# Patient Record
Sex: FEMALE | Race: WHITE | NOT HISPANIC OR LATINO | Employment: PART TIME | URBAN - METROPOLITAN AREA
[De-identification: names, ages, dates, MRNs, and addresses within clinical notes are randomized per-mention and may not be internally consistent; named-entity substitution may affect disease eponyms.]

---

## 2022-10-04 NOTE — TELEPHONE ENCOUNTER
RECORDS RECEIVED FROM: Srinivas   REASON FOR VISIT: headaches, dizziness, memory issues   Date of Appt: 10/14/2022   NOTES (FOR ALL VISITS) STATUS DETAILS   OFFICE NOTE from referring provider Care Everywhere 09/16/2022 [facility]    OFFICE NOTE from other specialist Care Everywhere 04/27/2022 [facility]    DISCHARGE SUMMARY from hospital N/A    DISCHARGE REPORT from the ER N/A    OPERATIVE REPORT N/A    MEDICATION LIST N/A    IMAGING  (FOR ALL VISITS)     EMG N/A    EEG N/A    LUMBAR PUNCTURE N/A    SHARYN SCAN N/A    ULTRASOUND (CAROTID BILAT) *VASCULAR* N/A    MRI (HEAD, NECK, SPINE) N/A    CT (HEAD, NECK, SPINE) N/A

## 2022-10-14 ENCOUNTER — VIRTUAL VISIT (OUTPATIENT)
Dept: NEUROLOGY | Facility: CLINIC | Age: 42
End: 2022-10-14
Payer: COMMERCIAL

## 2022-10-14 ENCOUNTER — PRE VISIT (OUTPATIENT)
Dept: NEUROLOGY | Facility: CLINIC | Age: 42
End: 2022-10-14

## 2022-10-14 DIAGNOSIS — G44.209 TENSION HEADACHE: Primary | ICD-10-CM

## 2022-10-14 DIAGNOSIS — R41.3 MEMORY LOSS: ICD-10-CM

## 2022-10-14 DIAGNOSIS — R42 DIZZINESS: ICD-10-CM

## 2022-10-14 PROCEDURE — 99204 OFFICE O/P NEW MOD 45 MIN: CPT | Mod: 95 | Performed by: PSYCHIATRY & NEUROLOGY

## 2022-10-14 RX ORDER — METHYLPREDNISOLONE 4 MG
TABLET, DOSE PACK ORAL
Qty: 21 TABLET | Refills: 0 | Status: SHIPPED | OUTPATIENT
Start: 2022-10-14 | End: 2022-11-16

## 2022-10-14 RX ORDER — SUMATRIPTAN 50 MG/1
TABLET, FILM COATED ORAL
COMMUNITY
Start: 2022-09-16 | End: 2022-11-16

## 2022-10-14 RX ORDER — ERGOCALCIFEROL 1.25 MG/1
CAPSULE, LIQUID FILLED ORAL
COMMUNITY
Start: 2022-05-03 | End: 2022-11-16

## 2022-10-14 RX ORDER — CLINDAMYCIN PHOSPHATE 11.9 MG/ML
SOLUTION TOPICAL 2 TIMES DAILY
COMMUNITY
Start: 2022-08-03

## 2022-10-14 RX ORDER — BACILLUS COAGULANS 1B CELL
1 CAPSULE ORAL DAILY
COMMUNITY
Start: 2022-05-31

## 2022-10-14 RX ORDER — MECLIZINE HCL 12.5 MG 12.5 MG/1
TABLET ORAL PRN
COMMUNITY
Start: 2022-09-16 | End: 2022-11-16

## 2022-10-14 NOTE — LETTER
"    10/14/2022         RE: Ml Grimm  [address] [facility] [address]        Dear Colleague,    Thank you for referring your patient, Ml Grimm, to the [facility] [place]. Please see a copy of my visit note below.    Neurology History and Physical Exam  [facility]      Patient:Ml Grimm  : 1980  Age: 42 year old  Today's Visit: 2022      History of present illness:   Ms. Ml Grimm is a 42 year old woman who is participating in this virtual visit for evaluation of dizziness, headache and memory loss.    The patient states her headaches started in August, and she has been having dizziness off and on.    Her headaches are mostly tension feeling around her head, sometimes in her teeth or in the back and starts coming forward. They are usually on the left side. It feels pressure. It can get to 5-6 in scale of 1 -10.  Sometimes gets nauseous, no vision changes. Denies auras. Denies photosensitivity or photosensitivity.  Rest helps. She takes Tylenol, which helps. Drinks more water. She doesn't know how long they last, ?hours wo Tylenol, tylenol usually helps but they come back. They happen daily. The longest pain-free was 24 hrs. She occasionally has dizziness, like \"motion sickness\". \"woozy\", it passes in a min or two. In one occasion, it continued for 10 min and she laid down.    She also has dizziness wo headache. It's not positional. They are brief, last about a min. Moving or turning her head may trigger or worsen it. She denies head injury.     Her memory loss and brain fog are worse since Covid. Her memory and word recall is impaired. Sometimes they are together.      She had Covid in 2022 and 2021. She had cold-like symptoms, the first time lost taste and body aches and brain fog, the second time, she had cold like symptoms.     She only had one migraine headache years ago.     Memory problems: recalling a word is difficult. " "Sometimes she doesn't recall a conversation with a coworker. She had trouble remembering how to play a board game. She had mild issues in the past, started early 2020, after Covid, \"brain fog\" has been getting worse.   Her father was diagnosed with \"cognitive decline\" last year. He is 69 years old. She forgets appointments. She tries to put it in calendar and reminders.      Past Medical History: Iron deficiency anemia, treated with Iron pills    Social History: she teaches preschooler, rarely drinks, she drinks 2 cups of coffee.      Family History: No FH of migraine       Current Outpatient Medications   Medication Sig Dispense Refill     Bacillus Coagulans-Inulin (PROBIOTIC FORMULA PO) Take 2 capsules by mouth daily       clindamycin (CLEOCIN T) 1 % external solution Apply topically 2 times daily apply to affected areas       PREBIOTIC PRODUCT PO Take 2 capsules by mouth daily       vitamin D2 (ERGOCALCIFEROL) 98582 units (1250 mcg) capsule TAKE 1 CAPSULE (50,000 UNITS) BY MOUTH EVERY MONDAY AND THURSDAY.       VITRON-C  MG TABS tablet Take 1 tablet by mouth daily       meclizine (ANTIVERT) 12.5 MG tablet as needed (Patient not taking: Reported on 10/14/2022)       SUMAtriptan (IMITREX) 50 MG tablet at onset of headache (Patient not taking: Reported on 10/14/2022)         Exam:    Wt Readings from Last 5 Encounters:   No data found for Wt     Alert, awake, NAD, no aphasia or dysarthria, EOMI, face is symmetric, moves upper extremities against gravity, normal finger-to-nose.    Assessment/Plan:   #1 dizziness and headaches: Patient's headaches are most consistent with tension headaches.  Her headache has been continuous.  I discussed taking Medrol Dosepak to stop the current headache.  Patient is concerned about possible structural lesion.  I am going to order a brain MRI.    #2 memory loss.  I discussed referral for neuropsychology evaluation.    Discussion: Many of the patient's symptoms started after her " COVID infection.  Dizziness and headaches especially started after COVID and memory loss worsened after COVID.  I suggested her to be seen at post-COVID clinic.    -Medrol Dosepak, take according to instructions    -Brain MRI with and without contrast    -Neuropsychological evaluation    -Post-COVID clinic referral    -Follow-up in 3 months      As described above, I met with the patient for 45 minutes and during this time counseling was greater than 50% of the visit time. This note was created in part by the use of Dragon voice recognition system. Inadvertent grammatical errors and typographical errors may still exist.  Marisol Morales MD

## 2022-10-14 NOTE — PROGRESS NOTES
"Ml is a 42 year old who is being evaluated via a billable video visit.      How would you like to obtain your AVS? MyChart  If the video visit is dropped, the invitation should be resent by: Send to e-mail at: [email or web address]  Will anyone else be joining your video visit? No    Video-Visit Details    Type of service:  Video Visit    Originating Location (pt. Location): Home        Distant Location (provider location):  Off-site    Platform used for Video Visit: Cintric     Neurology History and Physical Exam  [facility]      Patient:Ml Grimm  : 1980  Age: 42 year old  Today's Visit: 2022      History of present illness:   Ms. Ml Grimm is a 42 year old woman who is participating in this virtual visit for evaluation of dizziness, headache and memory loss.    The patient states her headaches started in August, and she has been having dizziness off and on.    Her headaches are mostly tension feeling around her head, sometimes in her teeth or in the back and starts coming forward. They are usually on the left side. It feels pressure. It can get to 5-6 in scale of 1 -10.  Sometimes gets nauseous, no vision changes. Denies auras. Denies photosensitivity or photosensitivity.  Rest helps. She takes Tylenol, which helps. Drinks more water. She doesn't know how long they last, ?hours wo Tylenol, tylenol usually helps but they come back. They happen daily. The longest pain-free was 24 hrs. She occasionally has dizziness, like \"motion sickness\". \"woozy\", it passes in a min or two. In one occasion, it continued for 10 min and she laid down.    She also has dizziness wo headache. It's not positional. They are brief, last about a min. Moving or turning her head may trigger or worsen it. She denies head injury.     Her memory loss and brain fog are worse since Covid. Her memory and word recall is impaired. Sometimes they are together.      She had Covid in 2022 and 2021. She had " "cold-like symptoms, the first time lost taste and body aches and brain fog, the second time, she had cold like symptoms.     She only had one migraine headache years ago.     Memory problems: recalling a word is difficult. Sometimes she doesn't recall a conversation with a coworker. She had trouble remembering how to play a board game. She had mild issues in the past, started early 2020, after Covid, \"brain fog\" has been getting worse.   Her father was diagnosed with \"cognitive decline\" last year. He is 69 years old. She forgets appointments. She tries to put it in calendar and reminders.      Past Medical History: Iron deficiency anemia, treated with Iron pills    Social History: she teaches preschooler, rarely drinks, she drinks 2 cups of coffee.      Family History: No FH of migraine       Current Outpatient Medications   Medication Sig Dispense Refill     Bacillus Coagulans-Inulin (PROBIOTIC FORMULA PO) Take 2 capsules by mouth daily       clindamycin (CLEOCIN T) 1 % external solution Apply topically 2 times daily apply to affected areas       PREBIOTIC PRODUCT PO Take 2 capsules by mouth daily       vitamin D2 (ERGOCALCIFEROL) 20775 units (1250 mcg) capsule TAKE 1 CAPSULE (50,000 UNITS) BY MOUTH EVERY MONDAY AND THURSDAY.       VITRON-C  MG TABS tablet Take 1 tablet by mouth daily       meclizine (ANTIVERT) 12.5 MG tablet as needed (Patient not taking: Reported on 10/14/2022)       SUMAtriptan (IMITREX) 50 MG tablet at onset of headache (Patient not taking: Reported on 10/14/2022)         Exam:    Wt Readings from Last 5 Encounters:   No data found for Wt     Alert, awake, NAD, no aphasia or dysarthria, EOMI, face is symmetric, moves upper extremities against gravity, normal finger-to-nose.    Assessment/Plan:   #1 dizziness and headaches: Patient's headaches are most consistent with tension headaches.  Her headache has been continuous.  I discussed taking Medrol Dosepak to stop the current headache.  " Patient is concerned about possible structural lesion.  I am going to order a brain MRI.    #2 memory loss.  I discussed referral for neuropsychology evaluation.    Discussion: Many of the patient's symptoms started after her COVID infection.  Dizziness and headaches especially started after COVID and memory loss worsened after COVID.  I suggested her to be seen at post-COVID clinic.    -Medrol Dosepak, take according to instructions    -Brain MRI with and without contrast    -Neuropsychological evaluation    -Post-COVID clinic referral    -Follow-up in 3 months        As described above, I met with the patient for 45 minutes and during this time counseling was greater than 50% of the visit time. This note was created in part by the use of Dragon voice recognition system. Inadvertent grammatical errors and typographical errors may still exist.  Marisol Morales MD

## 2022-10-19 ENCOUNTER — VIRTUAL VISIT (OUTPATIENT)
Dept: PHYSICAL MEDICINE AND REHAB | Facility: CLINIC | Age: 42
End: 2022-10-19
Attending: PSYCHIATRY & NEUROLOGY
Payer: COMMERCIAL

## 2022-10-19 DIAGNOSIS — U09.9 POST-COVID CHRONIC FATIGUE: Primary | ICD-10-CM

## 2022-10-19 DIAGNOSIS — G47.10 EXCESSIVE SLEEPINESS: ICD-10-CM

## 2022-10-19 DIAGNOSIS — R42 DIZZINESS: ICD-10-CM

## 2022-10-19 DIAGNOSIS — R41.3 MEMORY LOSS: ICD-10-CM

## 2022-10-19 DIAGNOSIS — G44.209 TENSION HEADACHE: ICD-10-CM

## 2022-10-19 DIAGNOSIS — G93.32 POST-COVID CHRONIC FATIGUE: Primary | ICD-10-CM

## 2022-10-19 PROCEDURE — 99204 OFFICE O/P NEW MOD 45 MIN: CPT | Mod: GT | Performed by: STUDENT IN AN ORGANIZED HEALTH CARE EDUCATION/TRAINING PROGRAM

## 2022-10-19 ASSESSMENT — ENCOUNTER SYMPTOMS
NAIL CHANGES: 1
SORE THROAT: 0
NECK MASS: 0
SPEECH CHANGE: 0
CONSTIPATION: 1
ABDOMINAL PAIN: 0
DECREASED CONCENTRATION: 1
BLOATING: 0
HOT FLASHES: 0
NUMBNESS: 0
BLOOD IN STOOL: 0
ALTERED TEMPERATURE REGULATION: 1
TROUBLE SWALLOWING: 0
DIZZINESS: 1
WEIGHT LOSS: 0
WEIGHT GAIN: 1
DEPRESSION: 0
JAUNDICE: 0
SEIZURES: 0
TASTE DISTURBANCE: 0
POOR WOUND HEALING: 0
SINUS CONGESTION: 0
TINGLING: 0
NIGHT SWEATS: 0
BOWEL INCONTINENCE: 0
FATIGUE: 1
DECREASED APPETITE: 0
HALLUCINATIONS: 0
PARALYSIS: 0
VOMITING: 0
HOARSE VOICE: 0
LOSS OF CONSCIOUSNESS: 0
MEMORY LOSS: 1
POLYPHAGIA: 0
SKIN CHANGES: 0
NERVOUS/ANXIOUS: 1
INCREASED ENERGY: 1
SINUS PAIN: 1
TREMORS: 0
DIARRHEA: 0
NAUSEA: 0
DISTURBANCES IN COORDINATION: 0
FEVER: 0
INSOMNIA: 0
HEARTBURN: 0
POLYDIPSIA: 0
CHILLS: 0
RECTAL PAIN: 1
PANIC: 0
WEAKNESS: 0
SMELL DISTURBANCE: 0
DECREASED LIBIDO: 0
HEADACHES: 1

## 2022-10-19 ASSESSMENT — ANXIETY QUESTIONNAIRES
GAD7 TOTAL SCORE: 4
6. BECOMING EASILY ANNOYED OR IRRITABLE: SEVERAL DAYS
IF YOU CHECKED OFF ANY PROBLEMS ON THIS QUESTIONNAIRE, HOW DIFFICULT HAVE THESE PROBLEMS MADE IT FOR YOU TO DO YOUR WORK, TAKE CARE OF THINGS AT HOME, OR GET ALONG WITH OTHER PEOPLE: SOMEWHAT DIFFICULT
3. WORRYING TOO MUCH ABOUT DIFFERENT THINGS: SEVERAL DAYS
7. FEELING AFRAID AS IF SOMETHING AWFUL MIGHT HAPPEN: NOT AT ALL
GAD7 TOTAL SCORE: 4
8. IF YOU CHECKED OFF ANY PROBLEMS, HOW DIFFICULT HAVE THESE MADE IT FOR YOU TO DO YOUR WORK, TAKE CARE OF THINGS AT HOME, OR GET ALONG WITH OTHER PEOPLE?: SOMEWHAT DIFFICULT
GAD7 TOTAL SCORE: 4
1. FEELING NERVOUS, ANXIOUS, OR ON EDGE: SEVERAL DAYS
7. FEELING AFRAID AS IF SOMETHING AWFUL MIGHT HAPPEN: NOT AT ALL
5. BEING SO RESTLESS THAT IT IS HARD TO SIT STILL: NOT AT ALL
2. NOT BEING ABLE TO STOP OR CONTROL WORRYING: NOT AT ALL
4. TROUBLE RELAXING: SEVERAL DAYS

## 2022-10-19 ASSESSMENT — PATIENT HEALTH QUESTIONNAIRE - PHQ9
SUM OF ALL RESPONSES TO PHQ QUESTIONS 1-9: 5
SUM OF ALL RESPONSES TO PHQ QUESTIONS 1-9: 5
10. IF YOU CHECKED OFF ANY PROBLEMS, HOW DIFFICULT HAVE THESE PROBLEMS MADE IT FOR YOU TO DO YOUR WORK, TAKE CARE OF THINGS AT HOME, OR GET ALONG WITH OTHER PEOPLE: SOMEWHAT DIFFICULT

## 2022-10-19 NOTE — LETTER
10/19/2022       RE: Ml Grimm  [address]  [place] [address]     Dear Colleague,    Thank you for referring your patient, Ml Grimm, to the [facility] [place] at [facility]. Please see a copy of my visit note below.    Total time including chart review, care-coordination and documentation time on the date of encounter - 55 mins          [facility]  Post COVID Clinic    Today's Date: 10/19/2022    Recommendations:  CMP, CRP, ESR, TSH - at [facility] lab   Try imitrex prescribed by neurology for headaches   Dialing down - as constantly on the go with kids and work  Meditation daily   Increase sleep duration as much as possible.   Try to not think about symptoms or illness   May benefit from seeing a gastroenterologist for rectal blood      Return visit 1 month     COVID vaccination - to address later     Thank you for involving me in the care of this patient.     Lisa Tobar MD    Assessment:  Ml Grimm is a 42 year old with PMH of memory issues (word, conversation or past event)    Post COVID symptoms: Diagnosed with COVID April 2021 and Jan 2022. Current symptoms include worsening of fatigue and brain fog after Jan 2022, headache, dizziness since Aug 2022 and increased need for sleep.     Discussed about central sensitization as the possible pathophysiology.     -------------------------------------------------------------------------------------------------------------------    Reason for Consult / Chief complaint:   Consulted by Dr. Morales for post COVID symptoms    History of Presenting Illness:  Ml Grimm is a 42 year old with PMH of memory issues (word, conversation or past event)    History of COVID-19 infection:  COVID test and Date: April 2021, Jan 2022 - [facility] PCR, at home test the second time   Symptoms: second time cold symptoms -  stuff nose, congestion, cough, brain fog   Hospitalization: no   Treatment: no   Oxygen: no  Intubation: no   Drug: no    Enrolled in clinical trial: no       Current symptoms:  Memory became worse after Jan 2022   Has probably been having Fatigue for a year which is worse after covid Jan 2022  Had work up with PCP when anemia was found in April with Hb of 11- was found to be iron deficient - got iron transfusion which seemed to help initially but then returned. Her Hb now is normal and she is quite tired.   Aug - developed headache with dizziness . Has Dizziness on and off, happens when she suddenly moves her head, lasts for a few seconds, has  headache everyday about 2/10. Saw neurology last Friday - medrol dose daniel has been prescribed.   She is sleeping more than usual and still feels like she could sleep more  - sleeps 7 hours at night. Sleep is cut short due to Early mornings at school   Did better in summer when she was off of work and could sleep in     Work situation:    and takes  classes 3 times a week     Provider   Link to Work Productivity and Activity Impairment Questionnaire :[id number]}    Assessment tools:    PHQ Assesment Total Score(s) 10/19/2022   PHQ-9 Score 5   Some recent data might be hidden     HUMPHREY-7 Results 10/19/2022   HUMPHREY 7 TOTAL SCORE 4 (minimal anxiety)   Some recent data might be hidden     PTSD Screen Score 10/19/2022   Have you ever experienced this kind of event? No   Some recent data might be hidden     Scoring Physical Function (higher score better) (range: 4 - 20) 20 (   Sum of 4 Physical Function Questions)   Scoring Anxiety (lower score better) (range: 4 - 20) 5 (   Sum of 4 Anxiety Questions)   Scoring Depression (lower score better) (range: 4 - 20) 5 (   Sum of 4 Depression Questions)   Scoring Fatigue (lower score better) (range: 4 - 20) 16 (   Sum of 4 Fatigue Questons )   Scoring Sleep Disturbance (lower score better) (range: 4 - 20) 9 (   Sum of  4 Sleep Disturbance Questions)   Scoring Satisfaction with Social Role (higher score better) (range: 4 - 20) 10 (   Sum of 4 Satisfaction with Social Role Questions)   Scoring Pain Interference (lower score better) (range: 4 - 20) 7 (   Sum of 4 Pain Interference Questions)         Work Productivity and Activity Impairment Questionnaire: General Health V2.0 10/19/2022   Are you currently employed (working for pay)? Yes   During the past seven days, how many hours did you miss from work because of your health problems?  0   During the past seven days, how many hours did you actually work? 28   During the past seven days, how much did your health problems affect your productivity while you were working? 2   During the past seven days, how much did your health problems affect your ability to do your regular activities, other than work at a job? 2       Current Concerns:  Health - yes   Other - stress of raising teenagers       Social Hx:  Social History     Tobacco Use     Smoking status: Never     Smokeless tobacco: Never   Vaping Use     Vaping Use: Never used     Lives with  and 3 children 16, 14 and 10 years of age     Review of Systems:  Review of Systems   Constitutional: Negative for chills, fever and weight loss.   HENT: Positive for sinus pain and tinnitus. Negative for ear discharge, ear pain, hearing loss, nosebleeds and sore throat.    Gastrointestinal: Positive for constipation and melena. Negative for abdominal pain, blood in stool, diarrhea, heartburn, nausea and vomiting.   Skin: Positive for itching. Negative for rash.   Neurological: Positive for dizziness and headaches. Negative for tingling, tremors, speech change, seizures, loss of consciousness and weakness.   Endo/Heme/Allergies: Negative for polydipsia.   Psychiatric/Behavioral: Positive for memory loss. Negative for depression and hallucinations. The patient is nervous/anxious. The patient does not have insomnia.       Constipation after  being on iron pills   Blood on tissue when she wipes after bowel movement     Immunizations:    There is no immunization history on file for this patient.      Allergies:   No Known Allergies      Medications:  Current Outpatient Medications   Medication Sig Dispense Refill     Bacillus Coagulans-Inulin (PROBIOTIC FORMULA PO) Take 2 capsules by mouth daily       clindamycin (CLEOCIN T) 1 % external solution Apply topically 2 times daily apply to affected areas       methylPREDNISolone (MEDROL DOSEPAK) 4 MG tablet therapy pack Follow Package Directions 21 tablet 0     vitamin D2 (ERGOCALCIFEROL) 35006 units (1250 mcg) capsule TAKE 1 CAPSULE (50,000 UNITS) BY MOUTH EVERY MONDAY AND THURSDAY.       VITRON-C  MG TABS tablet Take 1 tablet by mouth daily       meclizine (ANTIVERT) 12.5 MG tablet as needed (Patient not taking: No sig reported)       PREBIOTIC PRODUCT PO Take 2 capsules by mouth daily       SUMAtriptan (IMITREX) 50 MG tablet at onset of headache (Patient not taking: No sig reported)         Examination:  Unable to fully examine due to virtual visit     Laboratory:  CE   CBC, ferritin, TSH, B12, Vitamin D have been checked    COVID-19 PCR Results    COVID-19 PCR Results 4/27/21   COVID-19 Virus by PCR (External Result) Positive (A)   (A) Abnormal value          COVID-19 Antibody Results, Testing for Immunity    COVID-19 Antibody Results, Testing for Immunity   No data to display.            Imaging:  None   MRI brain scheduled       Answers for HPI/ROS submitted by the patient on 10/19/2022  If you checked off any problems, how difficult have these problems made it for you to do your work, take care of things at home, or get along with other people?: Somewhat difficult  PHQ9 TOTAL SCORE: 5  HUMPHREY 7 TOTAL SCORE: 4  General Symptoms: Yes  Skin Symptoms: Yes  HENT Symptoms: Yes  EYE SYMPTOMS: No  HEART SYMPTOMS: No  LUNG SYMPTOMS: No  INTESTINAL SYMPTOMS: Yes  URINARY SYMPTOMS: No  GYNECOLOGIC SYMPTOMS:  Yes  BREAST SYMPTOMS: No  SKELETAL SYMPTOMS: No  BLOOD SYMPTOMS: No  NERVOUS SYSTEM SYMPTOMS: Yes  MENTAL HEALTH SYMPTOMS: Yes  Congestion: No  Trouble swallowing: No   Voice hoarseness: No  Mouth sores: No  Tooth pain: No  Gum tenderness: No  Bleeding gums: No  Change in taste: No  Change in sense of smell: No  Dry mouth: No  Hearing aid used: No  Neck lump: No  Loss of appetite: No  Weight gain: Yes  Fatigue: Yes  Night sweats: No  Increased stress: Yes  Excessive hunger: No  Feeling hot or cold when others believe the temperature is normal: Yes  Loss of height: No  Post-operative complications: No  Surgical site pain: No  Change in or Loss of Energy: Yes  Hyperactivity: No  Confusion: No  Changes in hair: Yes  Changes in moles/birth marks: No  Changes in nails: Yes  Acne: Yes  Hair in places you don't want it: No  Change in facial hair: No  Warts: No  Non-healing sores: No  Scarring: No  Flaking of skin: No  Color changes of hands/feet in cold : No  Sun sensitivity: No  Skin thickening: No  Bloating: No  Rectal or Anal pain: Yes  Fecal incontinence: No  Yellowing of skin or eyes: No  Vomit with blood: No  Change in stools: No  Bleeding or spotting between periods: Yes  Heavy or painful periods: No  Irregular periods: No  Vaginal discharge: No  Hot flashes: No  Vaginal dryness: No  Genital ulcers: No  Reduced libido: No  Painful intercourse: No  Difficulty with sexual arousal: No  Post-menopausal bleeding: No  Trouble with coordination: No  Difficulty walking: No  Paralysis: No  Numbness: No  Trouble thinking or concentrating: Yes  Mood changes: No  Panic attacks: No          Sincerely,    Lisa Tobar MD

## 2022-10-19 NOTE — PROGRESS NOTES
Ml is a 42 year old who is being evaluated via a billable video visit.      How would you like to obtain your AVS? MyChart  If the video visit is dropped, the invitation should be resent by: Send to e-mail at: [email or web address]  Will anyone else be joining your video visit? No      Video-Visit Details     Video Start Time: 3.07 pm     Type of service:  Video Visit    Video End Time:3:44 PM    Originating Location (pt. Location): Home        Distant Location (provider location):  Off-site    Platform used for Video Visit: [employer]       Total time including chart review, care-coordination and documentation time on the date of encounter - 55 mins          [facility]  Post COVID Clinic    Today's Date: 10/19/2022    Recommendations:  CMP, CRP, ESR, TSH - at closest [facility]   Try imitrex prescribed by neurology for headaches   Dialing down - as constantly on the go with kids and work  Meditation daily   Increase sleep duration as much as possible.   Try to not think about symptoms or illness   May benefit from seeing a gastroenterologist for rectal blood      Return visit 1 month     COVID vaccination - to address later     Thank you for involving me in the care of this patient.     Lisa Tobar MD    Assessment:  Ml Grimm is a 42 year old with PMH of memory issues (word, conversation or past event)    Post COVID symptoms: Diagnosed with COVID April 2021 and Jan 2022. Current symptoms include worsening of fatigue and brain fog after Jan 2022, headache, dizziness since Aug 2022 and increased need for sleep.     Discussed about central sensitization as the possible pathophysiology.     -------------------------------------------------------------------------------------------------------------------    Reason for Consult / Chief complaint:   Consulted by Dr. Morales for post COVID symptoms    History of Presenting Illness:  Ml Grimm is a 42 year old with PMH of memory  issues (word, conversation or past event)    History of COVID-19 infection:  COVID test and Date: April 2021, Jan 2022 - [facility] PCR, at home test the second time   Symptoms: second time cold symptoms - stuff nose, congestion, cough, brain fog   Hospitalization: no   Treatment: no   Oxygen: no  Intubation: no   Drug: no    Enrolled in clinical trial: no       Current symptoms:  Memory became worse after Jan 2022   Has probably been having Fatigue for a year which is worse after covid Jan 2022  Had work up with PCP when anemia was found in April with Hb of 11- was found to be iron deficient - got iron transfusion which seemed to help initially but then returned. Her Hb now is normal and she is quite tired.   Aug - developed headache with dizziness . Has Dizziness on and off, happens when she suddenly moves her head, lasts for a few seconds, has  headache everyday about 2/10. Saw neurology last Friday - medrol dose daniel has been prescribed.   She is sleeping more than usual and still feels like she could sleep more  - sleeps 7 hours at night. Sleep is cut short due to Early mornings at school   Did better in summer when she was off of work and could sleep in     Work situation:    and takes  classes 3 times a week     Provider   Link to Work Productivity and Activity Impairment Questionnaire :[id number]}    Assessment tools:    PHQ Assesment Total Score(s) 10/19/2022   PHQ-9 Score 5   Some recent data might be hidden     HUMPHREY-7 Results 10/19/2022   HUMPHREY 7 TOTAL SCORE 4 (minimal anxiety)   Some recent data might be hidden     PTSD Screen Score 10/19/2022   Have you ever experienced this kind of event? No   Some recent data might be hidden     Scoring Physical Function (higher score better) (range: 4 - 20) 20 (   Sum of 4 Physical Function Questions)   Scoring Anxiety (lower score better) (range: 4 - 20) 5 (   Sum of 4 Anxiety Questions)   Scoring Depression (lower score better) (range: 4 -  20) 5 (   Sum of 4 Depression Questions)   Scoring Fatigue (lower score better) (range: 4 - 20) 16 (   Sum of 4 Fatigue Questons )   Scoring Sleep Disturbance (lower score better) (range: 4 - 20) 9 (   Sum of 4 Sleep Disturbance Questions)   Scoring Satisfaction with Social Role (higher score better) (range: 4 - 20) 10 (   Sum of 4 Satisfaction with Social Role Questions)   Scoring Pain Interference (lower score better) (range: 4 - 20) 7 (   Sum of 4 Pain Interference Questions)         Work Productivity and Activity Impairment Questionnaire: General Health V2.0 10/19/2022   Are you currently employed (working for pay)? Yes   During the past seven days, how many hours did you miss from work because of your health problems?  0   During the past seven days, how many hours did you actually work? 28   During the past seven days, how much did your health problems affect your productivity while you were working? 2   During the past seven days, how much did your health problems affect your ability to do your regular activities, other than work at a job? 2       Current Concerns:  Health - yes   Other - stress of raising teenagers       Social Hx:  Social History     Tobacco Use     Smoking status: Never     Smokeless tobacco: Never   Vaping Use     Vaping Use: Never used     Lives with  and 3 children 16, 14 and 10 years of age     Review of Systems:  Review of Systems   Constitutional: Negative for chills, fever and weight loss.   HENT: Positive for sinus pain and tinnitus. Negative for ear discharge, ear pain, hearing loss, nosebleeds and sore throat.    Gastrointestinal: Positive for constipation and melena. Negative for abdominal pain, blood in stool, diarrhea, heartburn, nausea and vomiting.   Skin: Positive for itching. Negative for rash.   Neurological: Positive for dizziness and headaches. Negative for tingling, tremors, speech change, seizures, loss of consciousness and weakness.   Endo/Heme/Allergies:  Negative for polydipsia.   Psychiatric/Behavioral: Positive for memory loss. Negative for depression and hallucinations. The patient is nervous/anxious. The patient does not have insomnia.       Constipation after being on iron pills   Blood on tissue when she wipes after bowel movement     Immunizations:    There is no immunization history on file for this patient.      Allergies:   No Known Allergies      Medications:  Current Outpatient Medications   Medication Sig Dispense Refill     Bacillus Coagulans-Inulin (PROBIOTIC FORMULA PO) Take 2 capsules by mouth daily       clindamycin (CLEOCIN T) 1 % external solution Apply topically 2 times daily apply to affected areas       methylPREDNISolone (MEDROL DOSEPAK) 4 MG tablet therapy pack Follow Package Directions 21 tablet 0     vitamin D2 (ERGOCALCIFEROL) 92537 units (1250 mcg) capsule TAKE 1 CAPSULE (50,000 UNITS) BY MOUTH EVERY MONDAY AND THURSDAY.       VITRON-C  MG TABS tablet Take 1 tablet by mouth daily       meclizine (ANTIVERT) 12.5 MG tablet as needed (Patient not taking: No sig reported)       PREBIOTIC PRODUCT PO Take 2 capsules by mouth daily       SUMAtriptan (IMITREX) 50 MG tablet at onset of headache (Patient not taking: No sig reported)         Examination:  Unable to fully examine due to virtual visit     Laboratory:  CE   CBC, ferritin, TSH, B12, Vitamin D have been checked    COVID-19 PCR Results    COVID-19 PCR Results 4/27/21   COVID-19 Virus by PCR (External Result) Positive (A)   (A) Abnormal value          COVID-19 Antibody Results, Testing for Immunity    COVID-19 Antibody Results, Testing for Immunity   No data to display.            Imaging:  None   MRI brain scheduled       Answers for HPI/ROS submitted by the patient on 10/19/2022  If you checked off any problems, how difficult have these problems made it for you to do your work, take care of things at home, or get along with other people?: Somewhat difficult  PHQ9 TOTAL SCORE:  5  HUMPHREY 7 TOTAL SCORE: 4  General Symptoms: Yes  Skin Symptoms: Yes  HENT Symptoms: Yes  EYE SYMPTOMS: No  HEART SYMPTOMS: No  LUNG SYMPTOMS: No  INTESTINAL SYMPTOMS: Yes  URINARY SYMPTOMS: No  GYNECOLOGIC SYMPTOMS: Yes  BREAST SYMPTOMS: No  SKELETAL SYMPTOMS: No  BLOOD SYMPTOMS: No  NERVOUS SYSTEM SYMPTOMS: Yes  MENTAL HEALTH SYMPTOMS: Yes  Congestion: No  Trouble swallowing: No   Voice hoarseness: No  Mouth sores: No  Tooth pain: No  Gum tenderness: No  Bleeding gums: No  Change in taste: No  Change in sense of smell: No  Dry mouth: No  Hearing aid used: No  Neck lump: No  Loss of appetite: No  Weight gain: Yes  Fatigue: Yes  Night sweats: No  Increased stress: Yes  Excessive hunger: No  Feeling hot or cold when others believe the temperature is normal: Yes  Loss of height: No  Post-operative complications: No  Surgical site pain: No  Change in or Loss of Energy: Yes  Hyperactivity: No  Confusion: No  Changes in hair: Yes  Changes in moles/birth marks: No  Changes in nails: Yes  Acne: Yes  Hair in places you don't want it: No  Change in facial hair: No  Warts: No  Non-healing sores: No  Scarring: No  Flaking of skin: No  Color changes of hands/feet in cold : No  Sun sensitivity: No  Skin thickening: No  Bloating: No  Rectal or Anal pain: Yes  Fecal incontinence: No  Yellowing of skin or eyes: No  Vomit with blood: No  Change in stools: No  Bleeding or spotting between periods: Yes  Heavy or painful periods: No  Irregular periods: No  Vaginal discharge: No  Hot flashes: No  Vaginal dryness: No  Genital ulcers: No  Reduced libido: No  Painful intercourse: No  Difficulty with sexual arousal: No  Post-menopausal bleeding: No  Trouble with coordination: No  Difficulty walking: No  Paralysis: No  Numbness: No  Trouble thinking or concentrating: Yes  Mood changes: No  Panic attacks: No

## 2022-10-19 NOTE — NURSING NOTE
Patient was unable to get to questionnaires right away due to daughter having emergency and patient just getting home from it. Patient is now logging into do questionnaires now. Ml informed daughter is very important and that comes first. We will figure out how to get questionnaires done before visit.    Maria Fernanda OCHOA, Virtual Visit Facilitator 2:16 PM October 19, 2022

## 2022-10-22 ENCOUNTER — HOSPITAL ENCOUNTER (OUTPATIENT)
Dept: MRI IMAGING | Facility: CLINIC | Age: 42
Discharge: HOME | End: 2022-10-22
Attending: PSYCHIATRY & NEUROLOGY | Admitting: PSYCHIATRY & NEUROLOGY
Payer: COMMERCIAL

## 2022-10-22 DIAGNOSIS — R41.3 MEMORY LOSS: ICD-10-CM

## 2022-10-22 DIAGNOSIS — R42 DIZZINESS: ICD-10-CM

## 2022-10-22 PROCEDURE — 255N000002 HC RX 255 OP 636: Performed by: PSYCHIATRY & NEUROLOGY

## 2022-10-22 PROCEDURE — A9585 GADOBUTROL INJECTION: HCPCS | Performed by: PSYCHIATRY & NEUROLOGY

## 2022-10-22 PROCEDURE — 70553 MRI BRAIN STEM W/O & W/DYE: CPT | Mod: 26 | Performed by: STUDENT IN AN ORGANIZED HEALTH CARE EDUCATION/TRAINING PROGRAM

## 2022-10-22 PROCEDURE — 70553 MRI BRAIN STEM W/O & W/DYE: CPT

## 2022-10-22 RX ORDER — GADOBUTROL 604.72 MG/ML
0.1 INJECTION INTRAVENOUS ONCE
Status: COMPLETED | OUTPATIENT
Start: 2022-10-22 | End: 2022-10-22

## 2022-10-22 RX ADMIN — GADOBUTROL 6.7 ML: 604.72 INJECTION INTRAVENOUS at 14:03

## 2022-11-03 DIAGNOSIS — I99.9 VASCULAR ABNORMALITY: Primary | ICD-10-CM

## 2022-11-03 NOTE — PROGRESS NOTES
MRI results were discussed with the patient and CTA head/neck were ordered.     Marisol Morales MD

## 2022-11-10 ENCOUNTER — LAB (OUTPATIENT)
Dept: LAB | Facility: OTHER | Age: 42
End: 2022-11-10
Payer: COMMERCIAL

## 2022-11-10 ENCOUNTER — ANCILLARY PROCEDURE (OUTPATIENT)
Dept: CT IMAGING | Facility: CLINIC | Age: 42
End: 2022-11-10
Attending: PSYCHIATRY & NEUROLOGY
Payer: COMMERCIAL

## 2022-11-10 DIAGNOSIS — G93.32 POST-COVID CHRONIC FATIGUE: ICD-10-CM

## 2022-11-10 DIAGNOSIS — U09.9 POST-COVID CHRONIC FATIGUE: ICD-10-CM

## 2022-11-10 DIAGNOSIS — I99.9 VASCULAR ABNORMALITY: ICD-10-CM

## 2022-11-10 LAB
ALBUMIN SERPL-MCNC: 4.1 G/DL (ref 3.4–5)
ALP SERPL-CCNC: 55 U/L (ref 40–150)
ALT SERPL W P-5'-P-CCNC: 26 U/L (ref 0–50)
ANION GAP SERPL CALCULATED.3IONS-SCNC: 4 MMOL/L (ref 3–14)
AST SERPL W P-5'-P-CCNC: 14 U/L (ref 0–45)
BILIRUB SERPL-MCNC: 0.5 MG/DL (ref 0.2–1.3)
BUN SERPL-MCNC: 10 MG/DL (ref 7–30)
CALCIUM SERPL-MCNC: 9.3 MG/DL (ref 8.5–10.1)
CHLORIDE BLD-SCNC: 107 MMOL/L (ref 94–109)
CO2 SERPL-SCNC: 29 MMOL/L (ref 20–32)
CREAT SERPL-MCNC: 0.63 MG/DL (ref 0.52–1.04)
CRP SERPL-MCNC: <2.9 MG/L (ref 0–8)
ERYTHROCYTE [SEDIMENTATION RATE] IN BLOOD BY WESTERGREN METHOD: 8 MM/HR (ref 0–20)
GFR SERPL CREATININE-BSD FRML MDRD: >90 ML/MIN/1.73M2
GLUCOSE BLD-MCNC: 88 MG/DL (ref 70–99)
POTASSIUM BLD-SCNC: 3.8 MMOL/L (ref 3.4–5.3)
PROT SERPL-MCNC: 7.3 G/DL (ref 6.8–8.8)
SODIUM SERPL-SCNC: 140 MMOL/L (ref 133–144)
TSH SERPL DL<=0.005 MIU/L-ACNC: 1.19 MU/L (ref 0.4–4)

## 2022-11-10 PROCEDURE — 80053 COMPREHEN METABOLIC PANEL: CPT

## 2022-11-10 PROCEDURE — 86140 C-REACTIVE PROTEIN: CPT

## 2022-11-10 PROCEDURE — 36415 COLL VENOUS BLD VENIPUNCTURE: CPT

## 2022-11-10 PROCEDURE — 70496 CT ANGIOGRAPHY HEAD: CPT | Mod: GC | Performed by: RADIOLOGY

## 2022-11-10 PROCEDURE — 85652 RBC SED RATE AUTOMATED: CPT

## 2022-11-10 PROCEDURE — 70498 CT ANGIOGRAPHY NECK: CPT | Mod: GC | Performed by: RADIOLOGY

## 2022-11-10 PROCEDURE — 84443 ASSAY THYROID STIM HORMONE: CPT

## 2022-11-10 RX ORDER — IOPAMIDOL 755 MG/ML
75 INJECTION, SOLUTION INTRAVASCULAR ONCE
Status: COMPLETED | OUTPATIENT
Start: 2022-11-10 | End: 2022-11-10

## 2022-11-10 RX ADMIN — IOPAMIDOL 75 ML: 755 INJECTION, SOLUTION INTRAVASCULAR at 13:42

## 2022-11-15 ASSESSMENT — ENCOUNTER SYMPTOMS
POOR WOUND HEALING: 0
POLYDIPSIA: 0
MEMORY LOSS: 1
INCREASED ENERGY: 1
NIGHT SWEATS: 0
POLYPHAGIA: 0
JAUNDICE: 0
JOINT SWELLING: 0
RECTAL PAIN: 0
NUMBNESS: 0
SEIZURES: 0
DIZZINESS: 1
ARTHRALGIAS: 0
WEIGHT LOSS: 0
NECK PAIN: 1
HEARTBURN: 0
BACK PAIN: 1
CONSTIPATION: 1
DIARRHEA: 0
TREMORS: 0
NAIL CHANGES: 1
WEIGHT GAIN: 0
VOMITING: 0
BOWEL INCONTINENCE: 0
ABDOMINAL PAIN: 0
CHILLS: 0
LOSS OF CONSCIOUSNESS: 0
MUSCLE CRAMPS: 0
MUSCLE WEAKNESS: 0
DISTURBANCES IN COORDINATION: 0
FEVER: 0
BLOOD IN STOOL: 0
HEADACHES: 1
ALTERED TEMPERATURE REGULATION: 1
SPEECH CHANGE: 0
TINGLING: 0
DECREASED APPETITE: 0
MYALGIAS: 1
STIFFNESS: 0
NAUSEA: 0
PARALYSIS: 0
WEAKNESS: 0
SKIN CHANGES: 0
FATIGUE: 1
HALLUCINATIONS: 0

## 2022-11-15 ASSESSMENT — ANXIETY QUESTIONNAIRES
2. NOT BEING ABLE TO STOP OR CONTROL WORRYING: NOT AT ALL
1. FEELING NERVOUS, ANXIOUS, OR ON EDGE: NOT AT ALL
3. WORRYING TOO MUCH ABOUT DIFFERENT THINGS: NOT AT ALL
6. BECOMING EASILY ANNOYED OR IRRITABLE: SEVERAL DAYS
5. BEING SO RESTLESS THAT IT IS HARD TO SIT STILL: NOT AT ALL
GAD7 TOTAL SCORE: 1
4. TROUBLE RELAXING: NOT AT ALL
GAD7 TOTAL SCORE: 1
7. FEELING AFRAID AS IF SOMETHING AWFUL MIGHT HAPPEN: NOT AT ALL
8. IF YOU CHECKED OFF ANY PROBLEMS, HOW DIFFICULT HAVE THESE MADE IT FOR YOU TO DO YOUR WORK, TAKE CARE OF THINGS AT HOME, OR GET ALONG WITH OTHER PEOPLE?: NOT DIFFICULT AT ALL
IF YOU CHECKED OFF ANY PROBLEMS ON THIS QUESTIONNAIRE, HOW DIFFICULT HAVE THESE PROBLEMS MADE IT FOR YOU TO DO YOUR WORK, TAKE CARE OF THINGS AT HOME, OR GET ALONG WITH OTHER PEOPLE: NOT DIFFICULT AT ALL
GAD7 TOTAL SCORE: 1
7. FEELING AFRAID AS IF SOMETHING AWFUL MIGHT HAPPEN: NOT AT ALL

## 2022-11-15 ASSESSMENT — PATIENT HEALTH QUESTIONNAIRE - PHQ9
10. IF YOU CHECKED OFF ANY PROBLEMS, HOW DIFFICULT HAVE THESE PROBLEMS MADE IT FOR YOU TO DO YOUR WORK, TAKE CARE OF THINGS AT HOME, OR GET ALONG WITH OTHER PEOPLE: SOMEWHAT DIFFICULT
SUM OF ALL RESPONSES TO PHQ QUESTIONS 1-9: 8
SUM OF ALL RESPONSES TO PHQ QUESTIONS 1-9: 8

## 2022-11-16 ENCOUNTER — VIRTUAL VISIT (OUTPATIENT)
Dept: PHYSICAL MEDICINE AND REHAB | Facility: CLINIC | Age: 42
End: 2022-11-16
Payer: COMMERCIAL

## 2022-11-16 DIAGNOSIS — R42 DIZZINESS: ICD-10-CM

## 2022-11-16 DIAGNOSIS — G44.209 TENSION HEADACHE: ICD-10-CM

## 2022-11-16 DIAGNOSIS — G93.32 POST-COVID CHRONIC FATIGUE: Primary | ICD-10-CM

## 2022-11-16 DIAGNOSIS — R41.3 MEMORY LOSS: ICD-10-CM

## 2022-11-16 DIAGNOSIS — Z23 NEED FOR VACCINATION: ICD-10-CM

## 2022-11-16 DIAGNOSIS — U09.9 POST-COVID CHRONIC FATIGUE: Primary | ICD-10-CM

## 2022-11-16 DIAGNOSIS — G47.10 EXCESSIVE SLEEPINESS: ICD-10-CM

## 2022-11-16 PROCEDURE — 99215 OFFICE O/P EST HI 40 MIN: CPT | Mod: GT | Performed by: STUDENT IN AN ORGANIZED HEALTH CARE EDUCATION/TRAINING PROGRAM

## 2022-11-16 NOTE — PROGRESS NOTES
Ml is a 42 year old who is being evaluated via a billable video visit.      How would you like to obtain your AVS? MyChart  If the video visit is dropped, the invitation should be resent by: Text to cell phone: [phone]  Will anyone else be joining your video visit? No     Video-Visit Details     Video Start Time: 4.13 pm     Type of service:  Video Visit    Video End Time: 4.37 pm     Originating Location (pt. Location): Home        Distant Location (provider location):  Off-site    Platform used for Video Visit: [employer]     Total time including chart review, care-coordination and documentation time on the date of encounter - 40 mins          [facility]  Post COVID Clinic    Today's Date: 11/16/2022    Recommendations:  Meditation consistently   Chimes every 1-2 hours to deep breathe and practice mindfulness  Doing everything slowly - thoughts, words and actions like walking, eating etc.   Have relaxed weekends as weekdays cannot be changed that much   Consider taking the imitrex for headaches   Continue to not think about symptoms or illness   Continue to get as much sleep as possible     Return visit 1 month     COVID vaccination - has not gotten the covid vaccines - due to the controversies and since she had gotten the infection early on  - she will need covid vaccination whenever she is comfortable (she does not want it now as she is not feeling well)    Thank you for involving me in the care of this patient.     Lisa Tobar MD    Assessment:  Ml Grimm is a 42 year old with PMH of memory issues (word, conversation or past event)    Post COVID symptoms: Diagnosed with COVID April 2021 and Jan 2022. Current symptoms include worsening of fatigue and brain fog after Jan 2022, headache, dizziness since Aug 2022 and increased need for sleep.     Discussed about central sensitization as the possible pathophysiology.      -------------------------------------------------------------------------------------------------------------------  Interval events:  Blood was ok   Headaches daily but not as severe and less duration  - not tried the imitrex as scared of the side effects   Feels she is Better compared to a month ago   dizzines is maybe better - turning quickly triggers it   Tired and brain fog still present   Able to incorporate a little bit  of the slowing down we discussed last visit - has many essential responsibilities with work and her kids   Does meditation a little bit   Gets atleast 5k steps a day   Able to sleep in on the weekends.     Scoring Anxiety (lower score better) (range: 4 - 20) 5 (   Sum of 4 Anxiety Questions)   Scoring Depression (lower score better) (range: 4 - 20) 6 (   Sum of 4 Depression Questions)   Scoring Fatigue (lower score better) (range: 4 - 20) 15 (   Sum of 4 Fatigue Questons )   Scoring Sleep Disturbance (lower score better) (range: 4 - 20) 11 (   Sum of 4 Sleep Disturbance Questions)   Scoring Satisfaction with Social Role (higher score better) (range: 4 - 20) 8 (   Sum of 4 Satisfaction with Social Role Questions)   Scoring Pain Interference (lower score better) (range: 4 - 20) 6 (   Sum of 4 Pain Interference Questions)     Reason for Consult / Chief complaint:   Consulted by Dr. Morales for post COVID symptoms    History of Presenting Illness:  Ml Grimm is a 42 year old with PMH of memory issues (word, conversation or past event)    History of COVID-19 infection:  COVID test and Date: April 2021, Jan 2022 - [facility] PCR, at home test the second time   Symptoms: second time cold symptoms - stuff nose, congestion, cough, brain fog   Hospitalization: no   Treatment: no   Oxygen: no  Intubation: no   Drug: no    Enrolled in clinical trial: no       Current symptoms:  Memory became worse after Jan 2022   Has probably been having Fatigue for a year which is worse after covid  Jan 2022  Had work up with PCP when anemia was found in April with Hb of 11- was found to be iron deficient - got iron transfusion which seemed to help initially but then returned. Her Hb now is normal and she is quite tired.   Aug - developed headache with dizziness . Has Dizziness on and off, happens when she suddenly moves her head, lasts for a few seconds, has  headache everyday about 2/10. Saw neurology last Friday - medrol dose daniel has been prescribed.   She is sleeping more than usual and still feels like she could sleep more  - sleeps 7 hours at night. Sleep is cut short due to Early mornings at school   Did better in summer when she was off of work and could sleep in     Work situation:    and takes  classes 3 times a week, works 5 days a week    Provider   Link to Work Productivity and Activity Impairment Questionnaire :[id number]}    Assessment tools:    PHQ Assesment Total Score(s) 11/15/2022   PHQ-9 Score 8   Some recent data might be hidden     HUMPHREY-7 Results 11/15/2022   HUMPHREY 7 TOTAL SCORE 1 (minimal anxiety)   Some recent data might be hidden     PTSD Screen Score 11/15/2022   Have you ever experienced this kind of event? No   Some recent data might be hidden     Scoring Physical Function (higher score better) (range: 4 - 20) 20 (   Sum of 4 Physical Function Questions)   Scoring Anxiety (lower score better) (range: 4 - 20) 5 (   Sum of 4 Anxiety Questions)   Scoring Depression (lower score better) (range: 4 - 20) 5 (   Sum of 4 Depression Questions)   Scoring Fatigue (lower score better) (range: 4 - 20) 16 (   Sum of 4 Fatigue Questons )   Scoring Sleep Disturbance (lower score better) (range: 4 - 20) 9 (   Sum of 4 Sleep Disturbance Questions)   Scoring Satisfaction with Social Role (higher score better) (range: 4 - 20) 10 (   Sum of 4 Satisfaction with Social Role Questions)   Scoring Pain Interference (lower score better) (range: 4 - 20) 7 (   Sum of 4 Pain Interference  Questions)         Work Productivity and Activity Impairment Questionnaire: General Health V2.0 10/19/2022   Are you currently employed (working for pay)? Yes   During the past seven days, how many hours did you miss from work because of your health problems?  0   During the past seven days, how many hours did you actually work? 28   During the past seven days, how much did your health problems affect your productivity while you were working? 2   During the past seven days, how much did your health problems affect your ability to do your regular activities, other than work at a job? 2       Current Concerns:  Health - yes   Other - stress of raising teenagers       Social Hx:  Social History     Tobacco Use     Smoking status: Never     Smokeless tobacco: Never   Vaping Use     Vaping Use: Never used     Lives with  and 3 children 16, 14 and 10 years of age     Review of Systems:  Review of Systems   Constitutional: Negative for chills, fever and weight loss.   HENT: Positive for sinus pain and tinnitus. Negative for ear discharge, ear pain, hearing loss, nosebleeds and sore throat.    Gastrointestinal: Positive for constipation and melena. Negative for abdominal pain, blood in stool, diarrhea, heartburn, nausea and vomiting.   Skin: Positive for itching. Negative for rash.   Neurological: Positive for dizziness and headaches. Negative for tingling, tremors, speech change, seizures, loss of consciousness and weakness.   Endo/Heme/Allergies: Negative for polydipsia.   Psychiatric/Behavioral: Positive for memory loss. Negative for depression and hallucinations. The patient is nervous/anxious. The patient does not have insomnia.       Constipation after being on iron pills   Blood on tissue when she wipes after bowel movement     Immunizations:    There is no immunization history on file for this patient.      Allergies:   No Known Allergies      Medications:  Current Outpatient Medications   Medication Sig  Dispense Refill     Bacillus Coagulans-Inulin (PROBIOTIC FORMULA PO) Take 2 capsules by mouth daily       clindamycin (CLEOCIN T) 1 % external solution Apply topically 2 times daily apply to affected areas       PREBIOTIC PRODUCT PO Take 2 capsules by mouth daily       VITRON-C  MG TABS tablet Take 1 tablet by mouth daily       meclizine (ANTIVERT) 12.5 MG tablet as needed (Patient not taking: Reported on 10/14/2022)       methylPREDNISolone (MEDROL DOSEPAK) 4 MG tablet therapy pack Follow Package Directions 21 tablet 0     SUMAtriptan (IMITREX) 50 MG tablet at onset of headache (Patient not taking: Reported on 10/14/2022)       vitamin D2 (ERGOCALCIFEROL) 79096 units (1250 mcg) capsule TAKE 1 CAPSULE (50,000 UNITS) BY MOUTH EVERY MONDAY AND THURSDAY.         Examination:  Unable to fully examine due to virtual visit     Laboratory:    CMP, CRP, ESR, TSH    CE   CBC, ferritin, TSH, B12, Vitamin D have been checked    COVID-19 PCR Results    COVID-19 PCR Results 4/27/21   COVID-19 Virus by PCR (External Result) Positive (A)   (A) Abnormal value          COVID-19 Antibody Results, Testing for Immunity    COVID-19 Antibody Results, Testing for Immunity   No data to display.            Imaging:  None   MRI brain scheduled

## 2022-11-16 NOTE — LETTER
11/16/2022       RE: Ml Grimm  [address] [facility] [address]     Dear Colleague,    Thank you for referring your patient, Ml Grimm, to the [facility] PHYSICAL MEDICINE AND REHABILITATION CLINIC [place] at [facility]. Please see a copy of my visit note below.        [facility]  Post COVID Clinic    Today's Date: 11/16/2022    Recommendations:  Meditation consistently   Chimes every 1-2 hours to deep breathe and practice mindfulness  Doing everything slowly - thoughts, words and actions like walking, eating etc.   Have relaxed weekends as weekdays cannot be changed that much   Consider taking the imitrex for headaches   Continue to not think about symptoms or illness   Continue to get as much sleep as possible     Return visit 1 month     COVID vaccination - has not gotten the covid vaccines - due to the controversies and since she had gotten the infection early on  - she will need covid vaccination whenever she is comfortable (she does not want it now as she is not feeling well)    Thank you for involving me in the care of this patient.     Lisa Tobar MD    Assessment:  Ml Grimm is a 42 year old with PMH of memory issues (word, conversation or past event)    Post COVID symptoms: Diagnosed with COVID April 2021 and Jan 2022. Current symptoms include worsening of fatigue and brain fog after Jan 2022, headache, dizziness since Aug 2022 and increased need for sleep.     Discussed about central sensitization as the possible pathophysiology.     -------------------------------------------------------------------------------------------------------------------  Interval events:  Blood was ok   Headaches daily but not as severe and less duration  - not tried the imitrex as scared of the side effects   Feels she is Better compared to a month ago   dizzines is maybe better - turning quickly triggers it   Tired and brain  fog still present   Able to incorporate a little bit  of the slowing down we discussed last visit - has many essential responsibilities with work and her kids   Does meditation a little bit   Gets atleast 5k steps a day   Able to sleep in on the weekends.     Scoring Anxiety (lower score better) (range: 4 - 20) 5 (   Sum of 4 Anxiety Questions)   Scoring Depression (lower score better) (range: 4 - 20) 6 (   Sum of 4 Depression Questions)   Scoring Fatigue (lower score better) (range: 4 - 20) 15 (   Sum of 4 Fatigue Questons )   Scoring Sleep Disturbance (lower score better) (range: 4 - 20) 11 (   Sum of 4 Sleep Disturbance Questions)   Scoring Satisfaction with Social Role (higher score better) (range: 4 - 20) 8 (   Sum of 4 Satisfaction with Social Role Questions)   Scoring Pain Interference (lower score better) (range: 4 - 20) 6 (   Sum of 4 Pain Interference Questions)     Reason for Consult / Chief complaint:   Consulted by Dr. Morales for post COVID symptoms    History of Presenting Illness:  Ml Grimm is a 42 year old with PMH of memory issues (word, conversation or past event)    History of COVID-19 infection:  COVID test and Date: April 2021, Jan 2022 - [facility] PCR, at home test the second time   Symptoms: second time cold symptoms - stuff nose, congestion, cough, brain fog   Hospitalization: no   Treatment: no   Oxygen: no  Intubation: no   Drug: no    Enrolled in clinical trial: no       Current symptoms:  Memory became worse after Jan 2022   Has probably been having Fatigue for a year which is worse after covid Jan 2022  Had work up with PCP when anemia was found in April with Hb of 11- was found to be iron deficient - got iron transfusion which seemed to help initially but then returned. Her Hb now is normal and she is quite tired.   Aug - developed headache with dizziness . Has Dizziness on and off, happens when she suddenly moves her head, lasts for a few seconds, has  headache  everyday about 2/10. Saw neurology last Friday - medrol dose daniel has been prescribed.   She is sleeping more than usual and still feels like she could sleep more  - sleeps 7 hours at night. Sleep is cut short due to Early mornings at school   Did better in summer when she was off of work and could sleep in     Work situation:    and takes  classes 3 times a week, works 5 days a week    Provider   Link to Work Productivity and Activity Impairment Questionnaire :[id number]}    Assessment tools:    PHQ Assesment Total Score(s) 11/15/2022   PHQ-9 Score 8   Some recent data might be hidden     HUMPHREY-7 Results 11/15/2022   HUMPHREY 7 TOTAL SCORE 1 (minimal anxiety)   Some recent data might be hidden     PTSD Screen Score 11/15/2022   Have you ever experienced this kind of event? No   Some recent data might be hidden     Scoring Physical Function (higher score better) (range: 4 - 20) 20 (   Sum of 4 Physical Function Questions)   Scoring Anxiety (lower score better) (range: 4 - 20) 5 (   Sum of 4 Anxiety Questions)   Scoring Depression (lower score better) (range: 4 - 20) 5 (   Sum of 4 Depression Questions)   Scoring Fatigue (lower score better) (range: 4 - 20) 16 (   Sum of 4 Fatigue Questons )   Scoring Sleep Disturbance (lower score better) (range: 4 - 20) 9 (   Sum of 4 Sleep Disturbance Questions)   Scoring Satisfaction with Social Role (higher score better) (range: 4 - 20) 10 (   Sum of 4 Satisfaction with Social Role Questions)   Scoring Pain Interference (lower score better) (range: 4 - 20) 7 (   Sum of 4 Pain Interference Questions)         Work Productivity and Activity Impairment Questionnaire: General Health V2.0 10/19/2022   Are you currently employed (working for pay)? Yes   During the past seven days, how many hours did you miss from work because of your health problems?  0   During the past seven days, how many hours did you actually work? 28   During the past seven days, how much did  your health problems affect your productivity while you were working? 2   During the past seven days, how much did your health problems affect your ability to do your regular activities, other than work at a job? 2       Current Concerns:  Health - yes   Other - stress of raising teenagers       Social Hx:  Social History     Tobacco Use     Smoking status: Never     Smokeless tobacco: Never   Vaping Use     Vaping Use: Never used     Lives with  and 3 children 16, 14 and 10 years of age     Review of Systems:  Review of Systems   Constitutional: Negative for chills, fever and weight loss.   HENT: Positive for sinus pain and tinnitus. Negative for ear discharge, ear pain, hearing loss, nosebleeds and sore throat.    Gastrointestinal: Positive for constipation and melena. Negative for abdominal pain, blood in stool, diarrhea, heartburn, nausea and vomiting.   Skin: Positive for itching. Negative for rash.   Neurological: Positive for dizziness and headaches. Negative for tingling, tremors, speech change, seizures, loss of consciousness and weakness.   Endo/Heme/Allergies: Negative for polydipsia.   Psychiatric/Behavioral: Positive for memory loss. Negative for depression and hallucinations. The patient is nervous/anxious. The patient does not have insomnia.       Constipation after being on iron pills   Blood on tissue when she wipes after bowel movement     Immunizations:    There is no immunization history on file for this patient.      Allergies:   No Known Allergies      Medications:  Current Outpatient Medications   Medication Sig Dispense Refill     Bacillus Coagulans-Inulin (PROBIOTIC FORMULA PO) Take 2 capsules by mouth daily       clindamycin (CLEOCIN T) 1 % external solution Apply topically 2 times daily apply to affected areas       PREBIOTIC PRODUCT PO Take 2 capsules by mouth daily       VITRON-C  MG TABS tablet Take 1 tablet by mouth daily       meclizine (ANTIVERT) 12.5 MG tablet as needed  (Patient not taking: Reported on 10/14/2022)       methylPREDNISolone (MEDROL DOSEPAK) 4 MG tablet therapy pack Follow Package Directions 21 tablet 0     SUMAtriptan (IMITREX) 50 MG tablet at onset of headache (Patient not taking: Reported on 10/14/2022)       vitamin D2 (ERGOCALCIFEROL) 73678 units (1250 mcg) capsule TAKE 1 CAPSULE (50,000 UNITS) BY MOUTH EVERY MONDAY AND THURSDAY.         Examination:  Unable to fully examine due to virtual visit     Laboratory:    CMP, CRP, ESR, TSH    CE   CBC, ferritin, TSH, B12, Vitamin D have been checked    COVID-19 PCR Results    COVID-19 PCR Results 4/27/21   COVID-19 Virus by PCR (External Result) Positive (A)   (A) Abnormal value          COVID-19 Antibody Results, Testing for Immunity    COVID-19 Antibody Results, Testing for Immunity   No data to display.            Imaging:  None   MRI brain scheduled         Sincerely,    Lisa Tobar MD

## 2023-02-12 ENCOUNTER — HEALTH MAINTENANCE LETTER (OUTPATIENT)
Age: 43
End: 2023-02-12

## 2023-06-04 ENCOUNTER — HEALTH MAINTENANCE LETTER (OUTPATIENT)
Age: 43
End: 2023-06-04

## 2024-03-10 ENCOUNTER — HEALTH MAINTENANCE LETTER (OUTPATIENT)
Age: 44
End: 2024-03-10

## 2024-07-28 ENCOUNTER — HEALTH MAINTENANCE LETTER (OUTPATIENT)
Age: 44
End: 2024-07-28

## 2025-08-10 ENCOUNTER — HEALTH MAINTENANCE LETTER (OUTPATIENT)
Age: 45
End: 2025-08-10